# Patient Record
Sex: MALE | Race: WHITE | NOT HISPANIC OR LATINO | Employment: OTHER | URBAN - METROPOLITAN AREA
[De-identification: names, ages, dates, MRNs, and addresses within clinical notes are randomized per-mention and may not be internally consistent; named-entity substitution may affect disease eponyms.]

---

## 2017-08-14 PROBLEM — C90.01 MULTIPLE MYELOMA IN REMISSION: Status: ACTIVE | Noted: 2017-08-14

## 2017-08-31 DIAGNOSIS — G63 NEUROPATHY ASSOCIATED WITH CANCER: Primary | ICD-10-CM

## 2017-08-31 DIAGNOSIS — G63 NEUROPATHY ASSOCIATED WITH CANCER: ICD-10-CM

## 2017-08-31 DIAGNOSIS — C80.1 NEUROPATHY ASSOCIATED WITH CANCER: Primary | ICD-10-CM

## 2017-08-31 DIAGNOSIS — C80.1 NEUROPATHY ASSOCIATED WITH CANCER: ICD-10-CM

## 2017-08-31 RX ORDER — GABAPENTIN 300 MG/1
CAPSULE ORAL
Qty: 120 CAPSULE | Refills: 0 | Status: SHIPPED | OUTPATIENT
Start: 2017-08-31 | End: 2017-08-31 | Stop reason: SDUPTHER

## 2017-08-31 RX ORDER — GABAPENTIN 300 MG/1
CAPSULE ORAL
Qty: 360 CAPSULE | Refills: 0 | Status: SHIPPED | OUTPATIENT
Start: 2017-08-31 | End: 2017-12-18 | Stop reason: SDUPTHER

## 2017-12-18 DIAGNOSIS — G63 NEUROPATHY ASSOCIATED WITH CANCER: ICD-10-CM

## 2017-12-18 DIAGNOSIS — C80.1 NEUROPATHY ASSOCIATED WITH CANCER: ICD-10-CM

## 2017-12-19 RX ORDER — GABAPENTIN 300 MG/1
CAPSULE ORAL
Qty: 360 CAPSULE | Refills: 0 | Status: SHIPPED | OUTPATIENT
Start: 2017-12-19 | End: 2018-03-15 | Stop reason: SDUPTHER

## 2018-03-15 ENCOUNTER — OFFICE VISIT (OUTPATIENT)
Dept: HEMATOLOGY/ONCOLOGY | Facility: CLINIC | Age: 48
End: 2018-03-15
Payer: OTHER GOVERNMENT

## 2018-03-15 VITALS
DIASTOLIC BLOOD PRESSURE: 82 MMHG | HEIGHT: 66 IN | HEART RATE: 74 BPM | WEIGHT: 156 LBS | BODY MASS INDEX: 25.07 KG/M2 | SYSTOLIC BLOOD PRESSURE: 123 MMHG | RESPIRATION RATE: 18 BRPM | TEMPERATURE: 98 F

## 2018-03-15 DIAGNOSIS — C80.1 NEUROPATHY ASSOCIATED WITH CANCER: ICD-10-CM

## 2018-03-15 DIAGNOSIS — C90.01 MULTIPLE MYELOMA IN REMISSION: Primary | ICD-10-CM

## 2018-03-15 DIAGNOSIS — G63 NEUROPATHY ASSOCIATED WITH CANCER: ICD-10-CM

## 2018-03-15 PROCEDURE — 99214 OFFICE O/P EST MOD 30 MIN: CPT | Mod: ,,, | Performed by: INTERNAL MEDICINE

## 2018-03-15 RX ORDER — GABAPENTIN 300 MG/1
300 CAPSULE ORAL 4 TIMES DAILY
Qty: 120 CAPSULE | Refills: 3 | Status: SHIPPED | OUTPATIENT
Start: 2018-03-15 | End: 2018-07-28 | Stop reason: SDUPTHER

## 2018-03-15 NOTE — PROGRESS NOTES
"   [facility] Hematology/Oncology  PROGRESS NOTE      Subjective:       Patient ID:   NAME: Parminder Cabrera Jr : 1970     48 y.o. male    Referring Doc: Chavo Cornelius  Other Physicians: Sheila    Chief Complaint:  Multiple myeloma f/u    History of Present Illness:     Patient returns today for a regularly scheduled follow-up visit.  The patient had left wrist fracture about 4 months ago and required surgery, He is still getting physical therapy. He saw Dr Sosa in 2018 and he has since stopped the revlimide due to neuropathy. He denies any CP, SOB, HA's or N/V.             ROS:   GEN: normal without any fever, night sweats or weight loss  HEENT: normal with no HA's, sore throat, stiff neck, changes in vision  CV: normal with no CP, SOB, PND, HERRNIG or orthopnea  PULM: normal with no SOB, cough, hemoptysis, sputum or pleuritic pain  GI: normal with no abdominal pain, nausea, vomiting, constipation, diarrhea, melanotic stools, BRBPR, or hematemesis  : normal with no hematuria, dysuria  BREAST: normal with no mass, discharge, pain  SKIN: normal with no rash, erythema, bruising, or swelling    Allergies:  Review of patient's allergies indicates:  Allergies not on file    Medications:    Current Outpatient Prescriptions:     gabapentin (NEURONTIN) 300 MG capsule, TAKE 1 CAPSULE BY MOUTH FOUR TIMES DAILY, Disp: 360 capsule, Rfl: 0    PMHx/PSHx Updates:  See patient's last visit with me on 2017  See H&P on 6/10/2015        Pathology:  See Vol #1          Objective:     Vitals:  Blood pressure 123/82, pulse 74, temperature 98.2 °F (36.8 °C), resp. rate 18, height 5' 6" (1.676 m), weight 70.8 kg (156 lb).    Physical Examination:   GEN: no apparent distress, comfortable; AAOx3  HEAD: atraumatic and normocephalic  EYES: no pallor, no icterus, PERRLA  ENT: OMM, no pharyngeal erythema, external ears WNL; no nasal discharge; no thrush  NECK: no masses, thyroid normal, trachea midline, no LAD/LN's, supple  CV: RRR with no " murmur; normal pulse; normal S1 and S2; no pedal edema  CHEST: Normal respiratory effort; CTAB; normal breath sounds; no wheeze or crackles  ABDOM: nontender and nondistended; soft; normal bowel sounds; no rebound/guarding  MUSC/Skeletal: ROM normal; no crepitus; joints normal; no deformities or arthropathy  EXTREM: no clubbing, cyanosis, inflammation or swelling  SKIN: no rashes, lesions, ulcers, petechiae or subcutaneous nodules  : no salomon  NEURO: grossly intact; motor/sensory WNL; AAOx3; no tremors  PSYCH: normal mood, affect and behavior  LYMPH: normal cervical, supraclavicular, axillary and groin LN's            Labs:     1/22/2018 on chart      Radiology/Diagnostic Studies:    No results found.    I have reviewed all available lab results and radiology reports.    Assessment/Plan:   (1) 48 y.o. male with diagnosis of of multiple myeloma  - he has been followed in conjunction with Dr Sosa at [facility] in [place]  - s/p auto-BMT at [facility] with subsequent CR  - he was previously on Revlimide maintenance but it was discontinued by Dr Sosa due to neuropathy issues    (2) residual neuropathy - chronic - with prior bout of shingles in Sept 2015  - he is on gabapentin        PLAN:  1. Set up MMR vaccine  2. SPEP, 24 UPEP, Ig panel and kappa/lambda ratio every 6 months  3. F/u with dr Sosa in one year planned  4. RTC 6 months  RTC in   Fax note to Adryan Sosa    Discussion:     I have explained all of the above in detail and the patient understands all of the current recommendation(s). I have answered all of their questions to the best of my ability and to their complete satisfaction.   The patient is to continue with the current management plan.            Electronically signed by Joe Jasmine MD

## 2018-03-16 ENCOUNTER — TELEPHONE (OUTPATIENT)
Dept: HEMATOLOGY/ONCOLOGY | Facility: CLINIC | Age: 48
End: 2018-03-16

## 2018-03-16 NOTE — TELEPHONE ENCOUNTER
Got an order from  at [place] for patient to get a MMR inj which will be the last of immunization needed post transplant. I spoke to /eddie pharmacist at [facility]. He said that he will order the medication and will have alejandro schedule to the patient for a day and time to come in for the injs. once the medication arrives. A copy of the order was given to alejandro at the inf registration desk. Order has also been scanned into the media section.

## 2018-03-16 NOTE — TELEPHONE ENCOUNTER
Patient needs MMR vaccine set up this month per [facility]. Will instruct office to set it up.

## 2018-06-25 ENCOUNTER — TELEPHONE (OUTPATIENT)
Dept: HEMATOLOGY/ONCOLOGY | Facility: CLINIC | Age: 48
End: 2018-06-25

## 2018-06-25 DIAGNOSIS — C90.01 MULTIPLE MYELOMA IN REMISSION: Primary | ICD-10-CM

## 2018-07-09 ENCOUNTER — OFFICE VISIT (OUTPATIENT)
Dept: INTERNAL MEDICINE | Facility: CLINIC | Age: 48
End: 2018-07-09
Payer: OTHER GOVERNMENT

## 2018-07-09 VITALS
SYSTOLIC BLOOD PRESSURE: 104 MMHG | DIASTOLIC BLOOD PRESSURE: 78 MMHG | TEMPERATURE: 99 F | OXYGEN SATURATION: 97 % | WEIGHT: 155 LBS | HEART RATE: 88 BPM | BODY MASS INDEX: 26.46 KG/M2 | HEIGHT: 64 IN

## 2018-07-09 DIAGNOSIS — F52.32 ANORGASMIA OF MALE: ICD-10-CM

## 2018-07-09 DIAGNOSIS — Z00.00 ENCOUNTER FOR PREVENTATIVE ADULT HEALTH CARE EXAMINATION: Primary | ICD-10-CM

## 2018-07-09 PROCEDURE — 99386 PREV VISIT NEW AGE 40-64: CPT | Mod: ,,, | Performed by: INTERNAL MEDICINE

## 2018-07-09 NOTE — LETTER
July 9, 2018      Joe Jasmine MD  [address]  [address]  [place] [address]           [facility] Internal Medicine  [address]  [place] [address]  Phone: [phone]  Fax: [phone]          Patient: Parminder Cabrera Jr   MR Number: [id number]   YOB: 1970   Date of Visit: 7/9/2018       Dear Dr. Joe Jasmine:    Thank you for referring Parminder Cabrera Jr to me for evaluation. Attached you will find relevant portions of my assessment and plan of care.    If you have questions, please do not hesitate to call me. I look forward to following Parminder Cabrera Jr along with you.    Sincerely,    Candelario Witt Jr., MD    Enclosure  CC:  No Recipients    If you would like to receive this communication electronically, please contact [email or web address] or [phone] to request more information on [employer] Link access.    For providers and/or their staff who would like to refer a patient to [facility], please contact us through our one-stop-shop provider referral line, [facility], at [phone].    If you feel you have received this communication in error or would no longer like to receive these types of communications, please e-mail [email or web address]

## 2018-07-09 NOTE — PROGRESS NOTES
Subjective:       Patient ID: Parminder Cabrera Jr is a 48 y.o. male.    Chief Complaint: Establish Care (new patient establishment (erectile dysfunction))    Here to establish care; needs routine check up.  Only complaint today is difficulty achieving orgasm.  He doesn't have any problems getting or maintaining erection but can't seem to reach climax.  Something he just noticed over the last month or so as he hadn't been in a relationship for about 3 years.  H/o MM s/p chemo and eventually stem cell transplant.  Has had some numbness in finger tips since the chemo.  Also had an episode of shingles on his face after chemo and has had post herpetic neuralgia since which is treated with gabapentin.  When not sexually active, he does masturbate and is able to reach orgasm but it does sometimes take awhile.  Using condoms routinely.        Review of Systems   Constitutional: Positive for fatigue. Negative for chills, fever and unexpected weight change.   HENT: Negative for congestion, hearing loss, postnasal drip, rhinorrhea, trouble swallowing and voice change.    Eyes: Negative for photophobia and visual disturbance.   Respiratory: Negative for apnea, cough, choking, chest tightness, shortness of breath and wheezing.    Cardiovascular: Negative for chest pain, palpitations and leg swelling.   Gastrointestinal: Negative for abdominal pain, blood in stool, constipation, diarrhea, nausea, rectal pain and vomiting.   Endocrine: Negative for cold intolerance, heat intolerance, polydipsia and polyuria.   Genitourinary: Negative for decreased urine volume, difficulty urinating, discharge, dysuria, flank pain, frequency, genital sores, hematuria, testicular pain and urgency.   Musculoskeletal: Positive for arthralgias, back pain, myalgias, neck pain and neck stiffness. Negative for gait problem. Joint swelling: knees.   Skin: Negative for color change, rash and wound.   Allergic/Immunologic: Negative for environmental allergies  and food allergies.   Neurological: Negative for dizziness, tremors, seizures, syncope, facial asymmetry, speech difficulty, weakness, light-headedness, numbness and headaches.   Hematological: Negative for adenopathy. Does not bruise/bleed easily.   Psychiatric/Behavioral: Negative for confusion, hallucinations, sleep disturbance and suicidal ideas. The patient is not nervous/anxious.        Past Medical History:   Diagnosis Date    Multiple myeloma in remission 8/14/2017    Post herpetic neuralgia     face      Past Surgical History:   Procedure Laterality Date    ABDOMINAL SURGERY      splenic repair following trauma    LIMBAL STEM CELL TRANSPLANT      ORIF CONGENITAL HIP DISLOCATION      ORIF    ORIF WRIST FRACTURE         Family History   Problem Relation Age of Onset    No Known Problems Mother     Lung cancer Father     Breast cancer Paternal Grandmother     Heart attacks under age 50 Neg Hx        Social History     Social History    Marital status: Single     Spouse name: N/A    Number of children: N/A    Years of education: N/A     Occupational History    disabled      Social History Main Topics    Smoking status: Current Every Day Smoker     Packs/day: 1.00     Types: Cigarettes    Smokeless tobacco: Never Used    Alcohol use Yes      Comment: moderate    Drug use: No    Sexual activity: Yes     Partners: Female     Birth control/ protection: Condom      Comment: single     Other Topics Concern    None     Social History Narrative    Live alone       Current Outpatient Prescriptions   Medication Sig Dispense Refill    gabapentin (NEURONTIN) 300 MG capsule Take 1 capsule (300 mg total) by mouth 4 (four) times daily. 120 capsule 3     No current facility-administered medications for this visit.        Review of patient's allergies indicates:   Allergen Reactions    Bactrim [sulfamethoxazole-trimethoprim]     Sulfa (sulfonamide antibiotics)      Objective:    HPI     Establish Care     "Additional comments: new patient establishment (erectile dysfunction)       Last edited by Gato Calixto MA on 7/9/2018  8:29 AM. (History)      Blood pressure 104/78, pulse 88, temperature 98.9 °F (37.2 °C), temperature source Temporal, height 5' 4" (1.626 m), weight 70.3 kg (155 lb), SpO2 97 %. Body mass index is 26.61 kg/m².   Physical Exam   Constitutional: He appears well-developed.   HENT:   Head: Normocephalic and atraumatic.   Right Ear: Hearing, tympanic membrane, external ear and ear canal normal.   Left Ear: Hearing, tympanic membrane, external ear and ear canal normal.   Nose: Nose normal.   Mouth/Throat: Uvula is midline and oropharynx is clear and moist.   Eyes: Conjunctivae, EOM and lids are normal. Pupils are equal, round, and reactive to light. Right eye exhibits no discharge. Left eye exhibits no discharge. Right conjunctiva is not injected. Right conjunctiva has no hemorrhage. Left conjunctiva is not injected. Left conjunctiva has no hemorrhage. No scleral icterus.   Neck: Carotid bruit is not present. No thyromegaly present.   Cardiovascular: Normal rate, regular rhythm and normal heart sounds.  Exam reveals no gallop and no friction rub.    No murmur heard.  Pulmonary/Chest: Effort normal and breath sounds normal. No respiratory distress. He has no wheezes. He has no rhonchi. He has no rales.   Abdominal: Soft. Bowel sounds are normal. He exhibits no distension, no abdominal bruit, no pulsatile midline mass and no mass. There is no hepatosplenomegaly. There is no tenderness. There is no rebound and no guarding. No hernia.   Musculoskeletal: He exhibits no edema.   Lymphadenopathy:     He has no cervical adenopathy.   Neurological: He is alert. He has normal reflexes. He displays no tremor. No cranial nerve deficit.   Skin: Skin is warm and dry.   Psychiatric: He has a normal mood and affect. His speech is normal and behavior is normal.   Nursing note and vitals reviewed.          Assessment: "       1. Encounter for preventative adult health care examination    2. Anorgasmia of male        Plan:       Parminder was seen today for \[facility]\"" care.    Diagnoses and all orders for this visit:    Encounter for preventative adult health care examination  Comments:  Discussed smoking cessation.   Has order for CMP in September; will get lipids also.  Orders:  -     Cancel: Lipid panel; Future  -     Lipid panel  -     Lipid panel; Future  -     Lipid panel    Anorgasmia of male  Comments:  Likely multifactorial.  It is a known SE of gabapentin; try dropping to TID.  Also has some neuropathy which combined with condom use is affecting sensation

## 2018-07-28 DIAGNOSIS — C80.1 NEUROPATHY ASSOCIATED WITH CANCER: ICD-10-CM

## 2018-07-28 DIAGNOSIS — G63 NEUROPATHY ASSOCIATED WITH CANCER: ICD-10-CM

## 2018-07-30 RX ORDER — GABAPENTIN 300 MG/1
CAPSULE ORAL
Qty: 120 CAPSULE | Refills: 0 | Status: SHIPPED | OUTPATIENT
Start: 2018-07-30 | End: 2018-08-27 | Stop reason: SDUPTHER

## 2018-08-01 ENCOUNTER — TELEPHONE (OUTPATIENT)
Dept: INTERNAL MEDICINE | Facility: CLINIC | Age: 48
End: 2018-08-01

## 2018-08-01 NOTE — TELEPHONE ENCOUNTER
----- Message from Maryann Martines sent at 7/31/2018  3:51 PM CDT -----  LAB, [facility] 2017

## 2018-08-27 DIAGNOSIS — C80.1 NEUROPATHY ASSOCIATED WITH CANCER: ICD-10-CM

## 2018-08-27 DIAGNOSIS — G63 NEUROPATHY ASSOCIATED WITH CANCER: ICD-10-CM

## 2018-08-27 RX ORDER — GABAPENTIN 300 MG/1
CAPSULE ORAL
Qty: 120 CAPSULE | Refills: 0 | Status: SHIPPED | OUTPATIENT
Start: 2018-08-27 | End: 2018-09-25 | Stop reason: SDUPTHER

## 2018-09-25 DIAGNOSIS — G63 NEUROPATHY ASSOCIATED WITH CANCER: ICD-10-CM

## 2018-09-25 DIAGNOSIS — C80.1 NEUROPATHY ASSOCIATED WITH CANCER: ICD-10-CM

## 2018-09-25 RX ORDER — GABAPENTIN 300 MG/1
CAPSULE ORAL
Qty: 120 CAPSULE | Refills: 0 | Status: SHIPPED | OUTPATIENT
Start: 2018-09-25 | End: 2018-11-01 | Stop reason: SDUPTHER

## 2018-10-01 ENCOUNTER — TELEPHONE (OUTPATIENT)
Dept: HEMATOLOGY/ONCOLOGY | Facility: CLINIC | Age: 48
End: 2018-10-01

## 2018-10-01 DIAGNOSIS — C90.01 MULTIPLE MYELOMA IN REMISSION: Primary | ICD-10-CM

## 2018-10-01 LAB
ALBUMIN SERPL-MCNC: 4.8 G/DL (ref 3.1–4.7)
ALP SERPL-CCNC: 51 IU/L (ref 40–104)
ALT (SGPT): 28 IU/L (ref 3–33)
AST SERPL-CCNC: 25 IU/L (ref 10–40)
BASOPHILS NFR BLD: 0 K/UL (ref 0–0.2)
BASOPHILS NFR BLD: 0.5 %
BILIRUB SERPL-MCNC: 0.7 MG/DL (ref 0.3–1)
BUN SERPL-MCNC: 10 MG/DL (ref 8–20)
CALCIUM SERPL-MCNC: 9.3 MG/DL (ref 7.7–10.4)
CHLORIDE: 101 MMOL/L (ref 98–110)
CO2 SERPL-SCNC: 27.8 MMOL/L (ref 22.8–31.6)
CREATININE: 0.91 MG/DL (ref 0.6–1.4)
EOSINOPHIL NFR BLD: 0.1 K/UL (ref 0–0.7)
EOSINOPHIL NFR BLD: 1.8 %
ERYTHROCYTE [DISTWIDTH] IN BLOOD BY AUTOMATED COUNT: 13.1 % (ref 12.5–14.5)
GLUCOSE: 138 MG/DL (ref 70–99)
GRAN #: 4.8 K/UL (ref 1.4–6.5)
GRAN%: 66 %
HCT VFR BLD AUTO: 44.5 % (ref 39–55)
HGB BLD-MCNC: 15.2 G/DL (ref 14–16)
IMMATURE GRANS (ABS): 0.1 K/UL (ref 0–1)
IMMATURE GRANULOCYTES: 1 %
LYMPH #: 1.4 K/UL (ref 1.2–3.4)
LYMPH%: 19.6 %
MCH RBC QN AUTO: 34.1 PG (ref 25–35)
MCHC RBC AUTO-ENTMCNC: 34.2 G/DL (ref 31–36)
MCV RBC AUTO: 99.8 FL (ref 80–100)
MONO #: 0.8 K/UL (ref 0.1–0.6)
MONO%: 11.1 %
NUCLEATED RBCS: 0 %
NUCLEATED RED BLOOD CELLS: 0 /100 WBC
PERFORMED BY:: ABNORMAL
PLATELET # BLD AUTO: 213 K/UL (ref 140–440)
PMV BLD AUTO: 8.9 FL (ref 8.8–12.7)
POTASSIUM SERPL-SCNC: 4.4 MMOL/L (ref 3.5–5)
PROT SERPL-MCNC: 7.4 G/DL (ref 6–8.2)
RBC # BLD AUTO: 4.46 M/UL (ref 4.3–5.9)
SODIUM: 137 MMOL/L (ref 134–144)
WBC # BLD: 7.3 K/UL (ref 5–10)

## 2018-10-02 LAB
KAPPA FREE LIGHT CHAINS: 11 MG/L (ref 3.3–19.4)
KAPPA/LAMBDA RATIO, S: 1.18 (ref 0.26–1.65)
LAMBDA FREE LIGHT CHAINS: 9.3 MG/L (ref 5.7–26.3)

## 2018-10-03 LAB
ALBUMIN SERPL-MCNC: 4.2 G/DL (ref 2.9–4.4)
ALBUMIN/GLOB SERPL ELPH: 1.4 {RATIO} (ref 0.7–1.7)
ALPHA 1 GLOBULIN/PROTEIN TOTAL: 0.3 G/DL (ref 0–0.4)
ALPHA 2 GLOBULIN/PROTEIN TOTAL: 0.6 G/DL (ref 0.4–1)
B-GLOBULIN FLD ELPH-MCNC: 1 G/DL (ref 0.7–1.3)
GAMMA GLOB FLD ELPH-MCNC: 1 G/DL (ref 0.4–1.8)
GLOBULIN SER CALC-MCNC: 2.9 G/DL (ref 2.2–3.9)
Lab: NORMAL
M PROTEIN MFR UR ELPH: NORMAL G/DL
PROT SERPL-MCNC: 7.1 G/DL (ref 6–8.5)

## 2018-10-09 ENCOUNTER — OFFICE VISIT (OUTPATIENT)
Dept: HEMATOLOGY/ONCOLOGY | Facility: CLINIC | Age: 48
End: 2018-10-09
Payer: OTHER GOVERNMENT

## 2018-10-09 VITALS
SYSTOLIC BLOOD PRESSURE: 122 MMHG | HEIGHT: 66 IN | WEIGHT: 150.63 LBS | DIASTOLIC BLOOD PRESSURE: 76 MMHG | BODY MASS INDEX: 24.21 KG/M2 | TEMPERATURE: 99 F | HEART RATE: 67 BPM

## 2018-10-09 DIAGNOSIS — C90.01 MULTIPLE MYELOMA IN REMISSION: Primary | ICD-10-CM

## 2018-10-09 PROCEDURE — 99214 OFFICE O/P EST MOD 30 MIN: CPT | Mod: ,,, | Performed by: INTERNAL MEDICINE

## 2018-10-09 NOTE — LETTER
October 9, 2018      Candelario Witt Jr., MD  [address]  [place] [address]           [facility] - Hematology Oncology  [address]  [address]  [place] [address]  Phone: [phone]  Fax: [phone]          Patient: Parminder Cabrera Jr   MR Number: [id number]   YOB: 1970   Date of Visit: 10/9/2018       Dear Dr. Candelario Witt Jr.:    Thank you for referring Parminder Cabrera Jr to me for evaluation. Attached you will find relevant portions of my assessment and plan of care.    If you have questions, please do not hesitate to call me. I look forward to following Parminder Cabrera Jr along with you.    Sincerely,    Joe Jasmine MD    Enclosure  CC:  No Recipients    If you would like to receive this communication electronically, please contact [email or web address] or [phone] to request more information on [employer] Link access.    For providers and/or their staff who would like to refer a patient to [facility], please contact us through our one-stop-shop provider referral line, [facility], at [phone].    If you feel you have received this communication in error or would no longer like to receive these types of communications, please e-mail [email or web address]

## 2018-10-09 NOTE — PROGRESS NOTES
"   [facility] Hematology/Oncology  PROGRESS NOTE      Subjective:       Patient ID:   NAME: Parminder Cabrera Jr : 1970     48 y.o. male    Referring Doc: Candelario Witt  Other Physicians: Sheila    Chief Complaint:  Multiple myeloma f/u    History of Present Illness:     Patient returns today for a regularly scheduled follow-up visit.  The patient was last seen in our clinic in 2018. He had seen Dr Sosa in 2018 and he since stopped the revlimide due to neuropathy. He denies any current CP, SOB, HA's or N/V. He is here by himself. He is moving to [place] but still plans to follow-up with us.             ROS:   GEN: normal without any fever, night sweats or weight loss  HEENT: normal with no HA's, sore throat, stiff neck, changes in vision  CV: normal with no CP, SOB, PND, HERRING or orthopnea  PULM: normal with no SOB, cough, hemoptysis, sputum or pleuritic pain  GI: normal with no abdominal pain, nausea, vomiting, constipation, diarrhea, melanotic stools, BRBPR, or hematemesis  : normal with no hematuria, dysuria  BREAST: normal with no mass, discharge, pain  SKIN: normal with no rash, erythema, bruising, or swelling    Allergies:  Review of patient's allergies indicates:  Allergies not on file    Medications:    Current Outpatient Medications:     gabapentin (NEURONTIN) 300 MG capsule, TAKE ONE CAPSULE BY MOUTH FOUR TIMES DAILY, Disp: 120 capsule, Rfl: 0    PMHx/PSHx Updates:  See patient's last visit with me on 3/16/2018  See H&P on 6/10/2015        Pathology:  See Vol #1          Objective:     Vitals:  Blood pressure 122/76, pulse 67, temperature 98.6 °F (37 °C), height 5' 6" (1.676 m), weight 68.3 kg (150 lb 9.6 oz).    Physical Examination:   GEN: no apparent distress, comfortable; AAOx3  HEAD: atraumatic and normocephalic  EYES: no pallor, no icterus, PERRLA  ENT: OMM, no pharyngeal erythema, external ears WNL; no nasal discharge; no thrush  NECK: no masses, thyroid normal, trachea midline, no LAD/LN's, " supple  CV: RRR with no murmur; normal pulse; normal S1 and S2; no pedal edema  CHEST: Normal respiratory effort; CTAB; normal breath sounds; no wheeze or crackles  ABDOM: nontender and nondistended; soft; normal bowel sounds; no rebound/guarding  MUSC/Skeletal: ROM normal; no crepitus; joints normal; no deformities or arthropathy  EXTREM: no clubbing, cyanosis, inflammation or swelling  SKIN: no rashes, lesions, ulcers, petechiae or subcutaneous nodules; tattoos   : no salomon  NEURO: grossly intact; motor/sensory WNL; AAOx3; no tremors  PSYCH: normal mood, affect and behavior  LYMPH: normal cervical, supraclavicular, axillary and groin LN's            Labs:     10/1/2018  Lab Results   Component Value Date    WBC 7.3 10/01/2018    HGB 15.2 10/01/2018    HCT 44.5 10/01/2018     10/01/2018     CMP  Sodium   Date Value Ref Range Status   10/01/2018 137 134 - 144 mmol/L      Potassium   Date Value Ref Range Status   10/01/2018 4.4 3.5 - 5.0 mmol/L      Chloride   Date Value Ref Range Status   10/01/2018 101 98 - 110 mmol/L      CO2   Date Value Ref Range Status   10/01/2018 27.8 22.8 - 31.6 mmol/L      Glucose   Date Value Ref Range Status   10/01/2018 138 (H) 70 - 99 mg/dL      BUN, Bld   Date Value Ref Range Status   10/01/2018 10 8 - 20 mg/dL      Creatinine   Date Value Ref Range Status   10/01/2018 0.91 0.60 - 1.40 mg/dL      Calcium   Date Value Ref Range Status   10/01/2018 9.3 7.7 - 10.4 mg/dL      Total Protein   Date Value Ref Range Status   10/01/2018 7.1 6.0 - 8.5 g/dL    10/01/2018 7.4 6.0 - 8.2 g/dL      Albumin   Date Value Ref Range Status   10/01/2018 4.2 2.9 - 4.4 g/dL    10/01/2018 4.8 (H) 3.1 - 4.7 g/dL      Total Bilirubin   Date Value Ref Range Status   10/01/2018 0.7 0.3 - 1.0 mg/dL      Alkaline Phosphatase   Date Value Ref Range Status   10/01/2018 51 40 - 104 IU/L      AST   Date Value Ref Range Status   10/01/2018 25 10 - 40 IU/L      Immunofixation Result, Serum Comment      An  apparent normal immunofixation pattern    Immunoglobulins (IgG, IgA, IgM) Quantitative   Order: [id number]   Status:  Final result   Visible to patient:  No (Not Released) Next appt:  None Dx:  Multiple myeloma in remission      Narrative   Performed by: [facility]                                VALUE             REFERENCE RANGE  UNITS                                         ----------------------------------------------------------------------------------  IgG, Total Serum              865                     700-1600  mg/dL       IgA, Serum                    174                         mg/dL       IgM, Total  Serum              89                         mg/dL                                                       Beta-2-Microglobulin Serum  1.3    Protein electrophoresis, serum   Order: [id number]   Status:  Final result   Visible to patient:  No (Not Released) Next appt:  None Dx:  Multiple myeloma in remission    Ref Range & Units 8d ago  (10/1/18) 8d ago  (10/1/18)   Total Protein 6.0 - 8.5 g/dL 7.1  7.4 R   Albumin 2.9 - 4.4 g/dL 4.2  4.8 Abnormally high  R   Alpha 1 Globulin/Protein Total 0.0 - 0.4 g/dL 0.3     Alpha 2 Globulin/Protein Total 0.4 - 1.0 g/dL 0.6     Beta Globulin 0.7 - 1.3 g/dL 1.0     Gamma Globulin 0.4 - 1.8 g/dL 1.0     Albumin/Globulin Ratio 0.7 - 1.7 1.4     M-Juan, % Not Observed g/dL Not Observed     Globulin, Total 2.2 - 3.9 g/dL 2.9             Immunoglobulin free LT chains blood   Order: [id number]   Status:  Final result   Visible to patient:  No (Not Released) Next appt:  None Dx:  Multiple myeloma in remission    Ref Range & Units 8d ago   [place] Free Light Chains 3.3 - 19.4 mg/L 11.0    Lambda Free Light Chains 5.7 - 26.3 mg/L 9.3    Kappa/Lambda Ratio, S 0.26 - 1.65 1.18                  Radiology/Diagnostic Studies:    No results found.    I have reviewed all available lab results and radiology reports.    Assessment/Plan:   (1) 48 y.o. male with diagnosis of of  multiple myeloma  - he has been followed in conjunction with Dr Sosa at [facility] in [place]  - s/p auto-BMT at [facility] with subsequent CR  - he was previously on Revlimide maintenance but it was discontinued by Dr Sosa due to neuropathy issues  - latest protein studies that are available appear to be adequate without presence of abnormal m-protein and he still appears to be in remission  - 24hr UPEP is pending (he turned it in today)    (2) residual neuropathy - chronic - with prior bout of shingles in Sept 2015  - he is on gabapentin    1. Multiple myeloma in remission           PLAN:  1.  F/u with PCP, etc  2. SPEP, 24 UPEP, Ig panel and kappa/lambda ratio every 6 months  3. F/u with Dr Sosa in one year planned (Jan 2019)  4. RTC 6 months     Fax note to Candelario Sosa    Discussion:     I have explained all of the above in detail and the patient understands all of the current recommendation(s). I have answered all of their questions to the best of my ability and to their complete satisfaction.   The patient is to continue with the current management plan.            Electronically signed by Joe Jasmine MD

## 2018-11-01 DIAGNOSIS — G63 NEUROPATHY ASSOCIATED WITH CANCER: ICD-10-CM

## 2018-11-01 DIAGNOSIS — C80.1 NEUROPATHY ASSOCIATED WITH CANCER: ICD-10-CM

## 2018-11-01 RX ORDER — GABAPENTIN 300 MG/1
CAPSULE ORAL
Qty: 120 CAPSULE | Refills: 0 | Status: SHIPPED | OUTPATIENT
Start: 2018-11-01 | End: 2018-12-26 | Stop reason: SDUPTHER

## 2018-12-26 DIAGNOSIS — C80.1 NEUROPATHY ASSOCIATED WITH CANCER: ICD-10-CM

## 2018-12-26 DIAGNOSIS — G63 NEUROPATHY ASSOCIATED WITH CANCER: ICD-10-CM

## 2018-12-26 RX ORDER — GABAPENTIN 300 MG/1
CAPSULE ORAL
Qty: 120 CAPSULE | Refills: 0 | Status: SHIPPED | OUTPATIENT
Start: 2018-12-26 | End: 2019-02-12 | Stop reason: SDUPTHER

## 2019-02-12 DIAGNOSIS — C80.1 NEUROPATHY ASSOCIATED WITH CANCER: ICD-10-CM

## 2019-02-12 DIAGNOSIS — G63 NEUROPATHY ASSOCIATED WITH CANCER: ICD-10-CM

## 2019-02-13 RX ORDER — GABAPENTIN 300 MG/1
CAPSULE ORAL
Qty: 120 CAPSULE | Refills: 0 | Status: SHIPPED | OUTPATIENT
Start: 2019-02-13 | End: 2019-03-25 | Stop reason: SDUPTHER

## 2019-03-25 DIAGNOSIS — C80.1 NEUROPATHY ASSOCIATED WITH CANCER: ICD-10-CM

## 2019-03-25 DIAGNOSIS — G63 NEUROPATHY ASSOCIATED WITH CANCER: ICD-10-CM

## 2019-03-26 RX ORDER — GABAPENTIN 300 MG/1
CAPSULE ORAL
Qty: 120 CAPSULE | Refills: 0 | Status: SHIPPED | OUTPATIENT
Start: 2019-03-26

## 2019-04-09 ENCOUNTER — TELEPHONE (OUTPATIENT)
Dept: HEMATOLOGY/ONCOLOGY | Facility: CLINIC | Age: 49
End: 2019-04-09

## 2019-04-09 NOTE — TELEPHONE ENCOUNTER
Called to see if the pt had any labs done prior to f/u appt.    Pt recently had labs and an office visit w/ Dr. correa request for the visit has been sent.

## 2019-04-09 NOTE — PROGRESS NOTES
[facility] Hematology/Oncology  PROGRESS NOTE      Subjective:       Patient ID:   NAME: Parminder Cabrera Jr : 1970     49 y.o. male    Referring Doc: Candelario Witt  Other Physicians: Sheila    Chief Complaint:  Multiple myeloma f/u    History of Present Illness:     Patient returns today for a regularly scheduled follow-up visit.  The patient was last seen in our clinic in Oct 2018. He had seen Dr Sosa about 3-4 months ago and had labs done at [facility]. He has been off the revlimide due to neuropathy. He denies any current CP, SOB, HA's or N/V. He is here by himself. He has since moved to [place].           ROS:   GEN: normal without any fever, night sweats or weight loss  HEENT: normal with no HA's, sore throat, stiff neck, changes in vision  CV: normal with no CP, SOB, PND, HERRING or orthopnea  PULM: normal with no SOB, cough, hemoptysis, sputum or pleuritic pain  GI: normal with no abdominal pain, nausea, vomiting, constipation, diarrhea, melanotic stools, BRBPR, or hematemesis  : normal with no hematuria, dysuria  BREAST: normal with no mass, discharge, pain  SKIN: normal with no rash, erythema, bruising, or swelling    Allergies:  Review of patient's allergies indicates:  Allergies not on file    Medications:    Current Outpatient Medications:     gabapentin (NEURONTIN) 300 MG capsule, TAKE ONE CAPSULE BY MOUTH FOUR TIMES DAILY, Disp: 120 capsule, Rfl: 0    PMHx/PSHx Updates:  See patient's last visit with me on 10/9/2018  See H&P on 6/10/2015        Pathology:  See Vol #1          Objective:     Vitals:  Blood pressure 120/82, pulse 73, temperature 98.5 °F (36.9 °C), temperature source Oral, resp. rate 20, weight 69.9 kg (154 lb 1.6 oz).    Physical Examination:   GEN: no apparent distress, comfortable; AAOx3  HEAD: atraumatic and normocephalic  EYES: no pallor, no icterus, PERRLA  ENT: OMM, no pharyngeal erythema, external ears WNL; no nasal discharge; no thrush  NECK: no masses, thyroid normal, trachea  midline, no LAD/LN's, supple  CV: RRR with no murmur; normal pulse; normal S1 and S2; no pedal edema  CHEST: Normal respiratory effort; CTAB; normal breath sounds; no wheeze or crackles  ABDOM: nontender and nondistended; soft; normal bowel sounds; no rebound/guarding  MUSC/Skeletal: ROM normal; no crepitus; joints normal; no deformities or arthropathy  EXTREM: no clubbing, cyanosis, inflammation or swelling  SKIN: no rashes, lesions, ulcers, petechiae or subcutaneous nodules; tattoos   : no salomon  NEURO: grossly intact; motor/sensory WNL; AAOx3; no tremors  PSYCH: normal mood, affect and behavior  LYMPH: normal cervical, supraclavicular, axillary and groin LN's            Labs:     10/1/2018  Lab Results   Component Value Date    WBC 7.3 10/01/2018    HGB 15.2 10/01/2018    HCT 44.5 10/01/2018     10/01/2018     CMP  Sodium   Date Value Ref Range Status   10/01/2018 137 134 - 144 mmol/L      Potassium   Date Value Ref Range Status   10/01/2018 4.4 3.5 - 5.0 mmol/L      Chloride   Date Value Ref Range Status   10/01/2018 101 98 - 110 mmol/L      CO2   Date Value Ref Range Status   10/01/2018 27.8 22.8 - 31.6 mmol/L      Glucose   Date Value Ref Range Status   10/01/2018 138 (H) 70 - 99 mg/dL      BUN, Bld   Date Value Ref Range Status   10/01/2018 10 8 - 20 mg/dL      Creatinine   Date Value Ref Range Status   10/01/2018 0.91 0.60 - 1.40 mg/dL      Calcium   Date Value Ref Range Status   10/01/2018 9.3 7.7 - 10.4 mg/dL      Total Protein   Date Value Ref Range Status   10/01/2018 7.1 6.0 - 8.5 g/dL    10/01/2018 7.4 6.0 - 8.2 g/dL      Albumin   Date Value Ref Range Status   10/01/2018 4.2 2.9 - 4.4 g/dL    10/01/2018 4.8 (H) 3.1 - 4.7 g/dL      Total Bilirubin   Date Value Ref Range Status   10/01/2018 0.7 0.3 - 1.0 mg/dL      Alkaline Phosphatase   Date Value Ref Range Status   10/01/2018 51 40 - 104 IU/L      AST   Date Value Ref Range Status   10/01/2018 25 10 - 40 IU/L      Immunofixation Result, Serum  Comment      An apparent normal immunofixation pattern    Immunoglobulins (IgG, IgA, IgM) Quantitative   Order: [id number]   Status:  Final result   Visible to patient:  No (Not Released) Next appt:  None Dx:  Multiple myeloma in remission      Narrative   Performed by: [facility]                                VALUE             REFERENCE RANGE  UNITS                                         ----------------------------------------------------------------------------------  IgG, Total Serum              865                     700-1600  mg/dL       IgA, Serum                    174                         mg/dL       IgM, Total  Serum              89                         mg/dL                                                       Beta-2-Microglobulin Serum  1.3    Protein electrophoresis, serum   Order: [id number]   Status:  Final result   Visible to patient:  No (Not Released) Next appt:  None Dx:  Multiple myeloma in remission    Ref Range & Units 8d ago  (10/1/18) 8d ago  (10/1/18)   Total Protein 6.0 - 8.5 g/dL 7.1  7.4 R   Albumin 2.9 - 4.4 g/dL 4.2  4.8 Abnormally high  R   Alpha 1 Globulin/Protein Total 0.0 - 0.4 g/dL 0.3     Alpha 2 Globulin/Protein Total 0.4 - 1.0 g/dL 0.6     Beta Globulin 0.7 - 1.3 g/dL 1.0     Gamma Globulin 0.4 - 1.8 g/dL 1.0     Albumin/Globulin Ratio 0.7 - 1.7 1.4     M-Juan, % Not Observed g/dL Not Observed     Globulin, Total 2.2 - 3.9 g/dL 2.9             Immunoglobulin free LT chains blood   Order: [id number]   Status:  Final result   Visible to patient:  No (Not Released) Next appt:  None Dx:  Multiple myeloma in remission    Ref Range & Units 8d ago   [place] Free Light Chains 3.3 - 19.4 mg/L 11.0    Lambda Free Light Chains 5.7 - 26.3 mg/L 9.3    Kappa/Lambda Ratio, S 0.26 - 1.65 1.18                  Radiology/Diagnostic Studies:    No results found.    I have reviewed all available lab results and radiology reports.    Assessment/Plan:   (1) 49 y.o. male with  diagnosis of of multiple myeloma  - he has been followed in conjunction with Dr Sosa at [facility] in [place]  - s/p auto-BMT at [facility] with subsequent CR  - he was previously on Revlimide maintenance but it was discontinued by Dr Sosa due to neuropathy issues  - latest protein studies that are available appear to be adequate without presence of abnormal m-protein and he still appears to be in remission  - he saw Dr Sosa in Jan 2019 and reports were all good per patient    (2) residual neuropathy - chronic - with prior bout of shingles in Sept 2015  - he is on gabapentin    1. Multiple myeloma in remission           PLAN:  1.  F/u with PCP, etc  2. SPEP, 24 UPEP, Ig panel and kappa/lambda ratio every 6 months  3. F/u with Dr Sosa in one year planned (Jan 2020)  4. Need latest note and labs from [facility] from Jan 2019  5. RTC 6 months     Fax note to Candelario Sosa    Discussion:     I have explained all of the above in detail and the patient understands all of the current recommendation(s). I have answered all of their questions to the best of my ability and to their complete satisfaction.   The patient is to continue with the current management plan.            Electronically signed by Joe Jasmine MD

## 2019-04-10 ENCOUNTER — OFFICE VISIT (OUTPATIENT)
Dept: HEMATOLOGY/ONCOLOGY | Facility: CLINIC | Age: 49
End: 2019-04-10
Payer: OTHER GOVERNMENT

## 2019-04-10 VITALS
TEMPERATURE: 99 F | DIASTOLIC BLOOD PRESSURE: 82 MMHG | SYSTOLIC BLOOD PRESSURE: 120 MMHG | HEART RATE: 73 BPM | RESPIRATION RATE: 20 BRPM | WEIGHT: 154.13 LBS | BODY MASS INDEX: 24.87 KG/M2

## 2019-04-10 DIAGNOSIS — C90.01 MULTIPLE MYELOMA IN REMISSION: Primary | ICD-10-CM

## 2019-04-10 PROCEDURE — 99214 PR OFFICE/OUTPT VISIT, EST, LEVL IV, 30-39 MIN: ICD-10-PCS | Mod: ,,, | Performed by: INTERNAL MEDICINE

## 2019-04-10 PROCEDURE — 99214 OFFICE O/P EST MOD 30 MIN: CPT | Mod: ,,, | Performed by: INTERNAL MEDICINE

## 2019-04-10 NOTE — LETTER
April 10, 2019      Candelario Witt Jr., MD  [address]  [place] [address]           [facility] - Hematology Oncology  [address]  [address]  [place] [address]  Phone: [phone]  Fax: [phone]          Patient: Parminder Cabrera Jr   MR Number: [id number]   YOB: 1970   Date of Visit: 4/10/2019       Dear Dr. Candelario Witt Jr.:    Thank you for referring Parminder Cabrera Jr to me for evaluation. Attached you will find relevant portions of my assessment and plan of care.    If you have questions, please do not hesitate to call me. I look forward to following Parminder Cabrera Jr along with you.    Sincerely,    Joe Jasmine MD    Enclosure  CC:  No Recipients    If you would like to receive this communication electronically, please contact [email or web address] or [phone] to request more information on [email or web address] Link access.    For providers and/or their staff who would like to refer a patient to [facility], please contact us through our one-stop-shop provider referral line, [facility], at [phone].    If you feel you have received this communication in error or would no longer like to receive these types of communications, please e-mail [email or web address]

## 2019-07-18 ENCOUNTER — TELEPHONE (OUTPATIENT)
Dept: HEMATOLOGY/ONCOLOGY | Facility: CLINIC | Age: 49
End: 2019-07-18

## 2019-07-18 NOTE — TELEPHONE ENCOUNTER
[facility] [place] [facility] Dr[facility] Sosa and would like to cancel this appt.  Pt will call our office if he needs anything.

## 2022-08-08 ENCOUNTER — TELEPHONE (OUTPATIENT)
Dept: HEMATOLOGY/ONCOLOGY | Facility: CLINIC | Age: 52
End: 2022-08-08

## 2022-08-08 NOTE — TELEPHONE ENCOUNTER
----- Message from Joe Jasmine MD sent at 8/3/2022 12:25 PM CDT -----  I don't believe we have seen this fellow in years